# Patient Record
Sex: FEMALE | Race: WHITE | URBAN - METROPOLITAN AREA
[De-identification: names, ages, dates, MRNs, and addresses within clinical notes are randomized per-mention and may not be internally consistent; named-entity substitution may affect disease eponyms.]

---

## 2021-08-06 ENCOUNTER — OFFICE VISIT (OUTPATIENT)
Dept: FAMILY MEDICINE CLINIC | Age: 31
End: 2021-08-06
Payer: MEDICAID

## 2021-08-06 VITALS
WEIGHT: 218 LBS | OXYGEN SATURATION: 99 % | DIASTOLIC BLOOD PRESSURE: 72 MMHG | BODY MASS INDEX: 33.04 KG/M2 | HEIGHT: 68 IN | TEMPERATURE: 96.9 F | SYSTOLIC BLOOD PRESSURE: 104 MMHG | HEART RATE: 70 BPM

## 2021-08-06 DIAGNOSIS — R21 RASH AND NONSPECIFIC SKIN ERUPTION: Primary | ICD-10-CM

## 2021-08-06 PROCEDURE — G8419 CALC BMI OUT NRM PARAM NOF/U: HCPCS | Performed by: PHYSICIAN ASSISTANT

## 2021-08-06 PROCEDURE — G8427 DOCREV CUR MEDS BY ELIG CLIN: HCPCS | Performed by: PHYSICIAN ASSISTANT

## 2021-08-06 PROCEDURE — 4004F PT TOBACCO SCREEN RCVD TLK: CPT | Performed by: PHYSICIAN ASSISTANT

## 2021-08-06 PROCEDURE — 96372 THER/PROPH/DIAG INJ SC/IM: CPT | Performed by: PHYSICIAN ASSISTANT

## 2021-08-06 PROCEDURE — 99203 OFFICE O/P NEW LOW 30 MIN: CPT | Performed by: PHYSICIAN ASSISTANT

## 2021-08-06 RX ORDER — PREDNISONE 10 MG/1
TABLET ORAL
Qty: 18 TABLET | Refills: 0 | Status: SHIPPED | OUTPATIENT
Start: 2021-08-06

## 2021-08-06 RX ORDER — METHYLPREDNISOLONE ACETATE 80 MG/ML
60 INJECTION, SUSPENSION INTRA-ARTICULAR; INTRALESIONAL; INTRAMUSCULAR; SOFT TISSUE ONCE
Status: COMPLETED | OUTPATIENT
Start: 2021-08-06 | End: 2021-08-06

## 2021-08-06 RX ADMIN — METHYLPREDNISOLONE ACETATE 60 MG: 80 INJECTION, SUSPENSION INTRA-ARTICULAR; INTRALESIONAL; INTRAMUSCULAR; SOFT TISSUE at 12:23

## 2021-08-06 NOTE — PROGRESS NOTES
21  Laura Wilkerson : 1990 Sex: female  Age 28 y.o. Subjective:  Chief Complaint   Patient presents with    Rash     3 weeks         HPI:   Laura Wilkerson , 28 y.o. female presents to express care for evaluation of poison ivy    HPI   29-year-old female presents to express care for evaluation of a rash. The patient has a rash diffusely throughout the body. The patient has noted this rash ongoing for about 3 weeks now. It does itch rather intensely. The patient is not any chest pain, shortness of breath. The patient is not having any fevers or chills. The patient is eating and drinking normally. States that her boyfriend had something similar on the chest and went to her. The patient states that she also goes outside to use the restroom they live in a tiny home. ROS:   Unless otherwise stated in this report the patient's positive and negative responses for review of systems for constitutional, eyes, ENT, cardiovascular, respiratory, gastrointestinal, neurological, , musculoskeletal, and integument systems and related systems to the presenting problem are either stated in the history of present illness or were not pertinent or were negative for the symptoms and/or complaints related to the presenting medical problem. Positives and pertinent negatives as per HPI. All others reviewed and are negative. PMH:   History reviewed. No pertinent past medical history. History reviewed. No pertinent surgical history. History reviewed. No pertinent family history.     Medications:     Current Outpatient Medications:     predniSONE (DELTASONE) 10 MG tablet, 3 tabs once daily for 3 days, 2 tabs once daily for 3 days, 1 tab once daily for 3 days, Disp: 18 tablet, Rfl: 0    Allergies:   No Known Allergies    Social History:     Social History     Tobacco Use    Smoking status: Not on file   Substance Use Topics    Alcohol use: Not on file    Drug use: Not on file       Patient lives at home.    Physical Exam:     Vitals:    08/06/21 1159   BP: 104/72   Site: Right Upper Arm   Position: Sitting   Pulse: 70   Temp: 96.9 °F (36.1 °C)   TempSrc: Temporal   SpO2: 99%   Weight: 218 lb (98.9 kg)   Height: 5' 8\" (1.727 m)       Exam:  Physical Exam  Vital Signs and nurse's notes are reviewed. The patient is not hypoxic. General: Alert, no acute distress, patient resting comfortably  Skin: warm, intact, no pallor noted. The patient has evidence of a maculopapular rash diffusely throughout the upper torso, the bilateral upper extremities and the bilateral lower extremities  the patient has no evidence of petechiae, purpura, or vesicles. The patient is no sloughing of the skin. Rash does diaz. The patient has no involvement of the palms, soles, or oral mucosa. The patient has no significant sloughing of the skin associated. Head: Normocephalic, atraumatic  Eye: Normal conjunctiva  Ears, nose, throat: moist mucous membranes, there is no involvement of the oral mucosa, there is no lip or tongue swelling. The patient has airway patent. The patient has no tonsillar hypertrophy or swelling to the posterior oropharynx. Cardiovascular: Regular Rate and Rhythm  Respiratory: No acute distress, no tachypnea, no evidence of rhonchi, wheezing, or rales noted in bilateral lung fields. No round noted. No retractions or stridor. Musculoskeletal: No obvious deformity, normal range of motion  Neurological: Alert and oriented x4, normal speech      Testing:           Medical Decision Making:     Vital signs reviewed    Past medical history reviewed. Allergies reviewed. Medications reviewed. Patient on arrival does not appear to be in any apparent distress or discomfort. The patient has been seen and evaluated. The patient does not appear to be toxic or lethargic. The patient does have evidence of a rash diffusely throughout the upper torso and the upper and lower extremities.   The patient will be treated with intramuscular injection methylprednisone. The patient will be started on tapering dose of prednisone. The patient may use Benadryl. Use calamine lotion, hydrocortisone cream.  Call with any questions or concerns. Return if any of the signs or symptoms change or worsen    The patient is to return to express care or go directly to the emergency department should any of the signs or symptoms worsen. The patient is to followup with primary care physician in 2-3 days for repeat evaluation. The patient has no other questions or concerns at this time the patient will be discharged home. Clinical Impression:   [facility] for [facility]. Diagnoses and all orders for this visit:    Rash and nonspecific skin eruption    Other orders  -     methylPREDNISolone acetate (DEPO-MEDROL) injection 60 mg  -     predniSONE (DELTASONE) 10 MG tablet; 3 tabs once daily for 3 days, 2 tabs once daily for 3 days, 1 tab once daily for 3 days        The patient is to call for any concerns or return if any of the signs or symptoms worsen. The patient is to follow-up with PCP in the next 2-3 days for repeat evaluation repeat assessment or go directly to the emergency department.      SIGNATURE: Bari Almonte III, PA-C